# Patient Record
Sex: FEMALE | Race: BLACK OR AFRICAN AMERICAN
[De-identification: names, ages, dates, MRNs, and addresses within clinical notes are randomized per-mention and may not be internally consistent; named-entity substitution may affect disease eponyms.]

---

## 2018-03-06 ENCOUNTER — HOSPITAL ENCOUNTER (EMERGENCY)
Dept: HOSPITAL 72 - EMR | Age: 68
Discharge: HOME | End: 2018-03-06
Payer: COMMERCIAL

## 2018-03-06 VITALS — WEIGHT: 170 LBS | BODY MASS INDEX: 27.32 KG/M2 | HEIGHT: 66 IN

## 2018-03-06 VITALS — DIASTOLIC BLOOD PRESSURE: 84 MMHG | SYSTOLIC BLOOD PRESSURE: 229 MMHG

## 2018-03-06 VITALS — SYSTOLIC BLOOD PRESSURE: 200 MMHG | DIASTOLIC BLOOD PRESSURE: 108 MMHG

## 2018-03-06 VITALS — SYSTOLIC BLOOD PRESSURE: 229 MMHG | DIASTOLIC BLOOD PRESSURE: 98 MMHG

## 2018-03-06 DIAGNOSIS — M79.642: ICD-10-CM

## 2018-03-06 DIAGNOSIS — I10: ICD-10-CM

## 2018-03-06 DIAGNOSIS — S52.502A: Primary | ICD-10-CM
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
## 2018-03-06 PROCEDURE — 99283 EMERGENCY DEPT VISIT LOW MDM: CPT

## 2018-03-06 PROCEDURE — 29125 APPL SHORT ARM SPLINT STATIC: CPT

## 2018-03-06 NOTE — EMERGENCY ROOM REPORT
History of Present Illness


General


Chief Complaint:  Multiple Trauma/Fall


Source:  Patient





Present Illness


HPI


67-year-old female, presenting with left wrist pain.  Patient states that she 

tripped, landed on her outstretched hand, now complaining of left wrist pain 

worse with any movement.  No head trauma no LOC no other injuries


Allergies:  


Coded Allergies:  


     No Known Allergies (Unverified , 3/6/18)





Patient History


Past Medical History:  see triage record


Past Surgical History:  none


Pertinent Family History:  none


Reviewed Nursing Documentation:  PMH: Agreed, PSxH: Agreed





Nursing Documentation-PMH


Past Medical History:  No History, Except For


Hx Hypertension:  Yes





Review of Systems


All Other Systems:  negative except mentioned in HPI





Physical Exam





Vital Signs








  Date Time  Temp Pulse Resp B/P (MAP) Pulse Ox O2 Delivery O2 Flow Rate FiO2


 


3/6/18 17:49 98.4 68 18 200/108 97 Room Air  





 98.4       








Sp02 EP Interpretation:  reviewed, normal


General Appearance:  alert, GCS 15, mild distress


Head:  normocephalic, atraumatic


Eyes:  bilateral eye normal inspection, bilateral eye PERRL, bilateral eye EOMI


ENT:  normal ENT inspection, normal pharynx, normal voice, moist mucus membranes


Neck:  normal inspection, full range of motion, supple


Respiratory:  normal inspection, lungs clear, normal breath sounds, no 

respiratory distress, no retraction, no wheezing, speaking full sentences, 

chest symmetrical


Cardiovascular #1:  normal inspection, regular rate, rhythm, no edema, normal 

capillary refill


Cardiovascular #2:  2+ radial (R), 2+ radial (L)


Gastrointestinal:  normal inspection, non tender, soft, non-distended, no 

guarding


Musculoskeletal:  other - Left wrist with bony deformity, tender to palpation 

especially medial aspect, limited range of motion secondary to pain, medial 

ulnar and radial nerves are intact


Neurologic:  normal inspection, alert, oriented x3, responsive, motor strength/

tone normal, sensory intact, normal gait, speech normal


Psychiatric:  normal inspection, judgement/insight normal, memory normal


Skin:  normal inspection, normal color, no rash, warm/dry, well hydrated, 

normal turgor





Procedures


Splinting


Splinting :  


   Consent:  Verbal


   Location:  L wrist


   Hand-Made Type:  plaster


   Splint:  volar


   Pre-Proc Neuro Vasc Exam:  normal


   Post-Proc Neuro Vasc Exam:  normal


   Patient Tolerated:  Well


   Complications:  None





Medical Decision Making


Diagnostic Impression:  


 Primary Impression:  


 Distal radius fracture, left


ER Course


67-year-old female with wrist pain





DDX: 


Contusion vs. fracture





Plan: 


Pain control 


XR








ER course:


Patient reports improvement of pain


Fracture noted, non displaced, volar splint applied, before and after 

neurovascularly intact.


patient instructed she needs to fu with orthopedic surgery in 1 week without 

fail as her fx extending into joint and can cause permamnent disability








Disposition:


Patient is to be discharged home 


Patient instructed to keep splint on at all times, and to follow up with 

orthopedic surgery in 1 week without fail


Patient educated to rest, ice, and elevate extremity and to avoid vigorous 

activity.


Strict precautions discussed with patient on when to return to the emergency 

room including increased redness or swelling joints, increased pain/swelling of 

extremity, fever or chills, which could indicate severe illness.





Please note that this Emergency Department Report was dictated using [employer] technology software, occasionally this can lead to 

erroneous entry secondary to interpretation by the dictation equipment.








Xray ordered: Left wrist


3 view


Indication: Pain


EP Interpretation: Yes


Interpretation: L distal radius fx extending into intraarticular joint


Impression:L distal radius fx extending into intraarticular joint


Electronically signed by Promise Bhatt MD





Xray: Left hand


3 view    Complete


Indication: Pain


EP Interpretation: Yes


Interpretation: L distal radius fx extending into intraarticular joint


Impression: L distal radius fx extending into intraarticular joint





Electronically signed by Promise Bhatt MD





Last Vital Signs








  Date Time  Temp Pulse Resp B/P (MAP) Pulse Ox O2 Delivery O2 Flow Rate FiO2


 


3/6/18 17:49 98.4 68 18 200/108 97 Room Air  





 98.4       








Disposition:  HOME, SELF-CARE


Condition:  Improved


Scripts


Oxycodone/Acetaminophen 5-325* (PERCOCET 5-325 MG TABLET*) 1 Each Tablet


1 TAB ORAL Q6H Y for For Pain, #20 TAB


   Prov: Promise Bhatt M.D.         3/6/18











Promise Bhatt M.D. Mar 6, 2018 18:10

## 2018-03-07 NOTE — DIAGNOSTIC IMAGING REPORT
Indication: Pain left wrist pain

 

Findings: 3  views of the left wrist were obtained. 

 

There is acute comminuted fracture of the distal radius. Fracture is intra-articular

and extends into the radiocarpal joint. Soft tissue swelling noted.

 

IMPRESSION:

 

Acute distal radius fracture

## 2018-03-07 NOTE — DIAGNOSTIC IMAGING REPORT
Indication: pain. Left hand pain

 

Findings: 3 views of the left hand were obtained. 

 

There is a fracture of the distal radius. This is discussed in the wrist report. No

fracture of the hand identified. Bones are osteopenic.

 

IMPRESSION:

 

Negative evaluation of the head.

 

Distal radius fracture